# Patient Record
Sex: FEMALE | Race: WHITE | URBAN - METROPOLITAN AREA
[De-identification: names, ages, dates, MRNs, and addresses within clinical notes are randomized per-mention and may not be internally consistent; named-entity substitution may affect disease eponyms.]

---

## 2022-12-06 ENCOUNTER — OFFICE VISIT (OUTPATIENT)
Dept: URBAN - METROPOLITAN AREA CLINIC 8 | Facility: CLINIC | Age: 67
End: 2022-12-06
Payer: MEDICARE

## 2022-12-06 DIAGNOSIS — C69.31 MALIGNANT NEOPLASM OF RIGHT CHOROID: ICD-10-CM

## 2022-12-06 DIAGNOSIS — H40.012 OPEN ANGLE WITH BORDERLINE FINDINGS, LOW RISK, LEFT EYE: Primary | ICD-10-CM

## 2022-12-06 DIAGNOSIS — H04.122 TEAR FILM INSUFFICIENCY OF LEFT LACRIMAL GLAND: ICD-10-CM

## 2022-12-06 DIAGNOSIS — H25.12 AGE-RELATED NUCLEAR CATARACT, LEFT EYE: ICD-10-CM

## 2022-12-06 DIAGNOSIS — H53.2 DIPLOPIA: ICD-10-CM

## 2022-12-06 PROCEDURE — 99204 OFFICE O/P NEW MOD 45 MIN: CPT | Performed by: OPHTHALMOLOGY

## 2022-12-06 RX ORDER — CYCLOSPORINE/CHONDROITIN SULFATE PF 1 MG/ML
EMULSION OPHTHALMIC
Qty: 90 | Refills: 3 | Status: ACTIVE
Start: 2022-12-06

## 2022-12-06 ASSESSMENT — INTRAOCULAR PRESSURE: OS: 17

## 2022-12-06 NOTE — IMPRESSION/PLAN
Impression: Open angle with borderline findings, low risk, left eye: H40.012. Plan: Findings discussed with patient. Return for OCT ONH and VF 24-2 OS and Pachs.

## 2022-12-06 NOTE — IMPRESSION/PLAN
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 Plan: Mild, advised patient of condition. Artificial tears 3-4x a day OS. ERX Cyclosporine BID OS

## 2022-12-06 NOTE — IMPRESSION/PLAN
Impression: Age-related nuclear cataract, left eye: H25.12. Plan: Discussed cataract diagnosis with the patient. Patient may be candidate for surgery when more symptomatic. Mild OS. No treatment currently recommended. The patient will monitor vision changes and contact us with any decrease in vision.

## 2022-12-06 NOTE — IMPRESSION/PLAN
Impression: Malignant neoplasm of right choroid: C69.31. Plan: History of Choroid Melanoma OD Prosthesis OD

## 2022-12-06 NOTE — IMPRESSION/PLAN
Impression: Diplopia: H53.2. Plan: May be due to cataract OS Advised patient to return for refraction

## 2022-12-07 ENCOUNTER — TESTING ONLY (OUTPATIENT)
Dept: URBAN - METROPOLITAN AREA CLINIC 8 | Facility: CLINIC | Age: 67
End: 2022-12-07
Payer: MEDICARE

## 2022-12-07 PROCEDURE — 92133 CPTRZD OPH DX IMG PST SGM ON: CPT | Performed by: OPHTHALMOLOGY

## 2022-12-07 PROCEDURE — 76514 ECHO EXAM OF EYE THICKNESS: CPT | Performed by: OPHTHALMOLOGY
